# Patient Record
Sex: FEMALE | Race: ASIAN | Employment: UNEMPLOYED | URBAN - METROPOLITAN AREA
[De-identification: names, ages, dates, MRNs, and addresses within clinical notes are randomized per-mention and may not be internally consistent; named-entity substitution may affect disease eponyms.]

---

## 2022-11-23 ENCOUNTER — HOSPITAL ENCOUNTER (EMERGENCY)
Age: 37
Discharge: HOME | End: 2022-11-23
Attending: EMERGENCY MEDICINE
Payer: COMMERCIAL

## 2022-11-23 VITALS
RESPIRATION RATE: 16 BRPM | SYSTOLIC BLOOD PRESSURE: 101 MMHG | HEART RATE: 84 BPM | OXYGEN SATURATION: 99 % | DIASTOLIC BLOOD PRESSURE: 61 MMHG | TEMPERATURE: 98.3 F

## 2022-11-23 DIAGNOSIS — Z20.822 CONTACT WITH AND (SUSPECTED) EXPOSURE TO COVID-19: Primary | ICD-10-CM

## 2022-11-23 LAB
COVID-19 RAPID TEST, COVR: NOT DETECTED
FLUAV AG NPH QL IA: NEGATIVE
FLUBV AG NOSE QL IA: NEGATIVE
SOURCE, COVRS: NORMAL

## 2022-11-23 PROCEDURE — 87635 SARS-COV-2 COVID-19 AMP PRB: CPT

## 2022-11-23 PROCEDURE — 87804 INFLUENZA ASSAY W/OPTIC: CPT

## 2022-11-23 PROCEDURE — U0005 INFEC AGEN DETEC AMPLI PROBE: HCPCS

## 2022-11-23 PROCEDURE — 99283 EMERGENCY DEPT VISIT LOW MDM: CPT

## 2022-11-24 NOTE — ED TRIAGE NOTES
Triage: Pt arrives ambulatory from home with CC of covid exposure. Pt denies symptoms but is concerned because she is 8 weeks pregnant.

## 2022-11-24 NOTE — DISCHARGE INSTRUCTIONS
You tested negative for flu and covid. You likely have a viral illness. Take tylenol as needed for fever/chills. Recommend follow-up with PCP as well as establishing care with an OBGYN in the area.
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
## 2022-12-08 ENCOUNTER — HOSPITAL ENCOUNTER (EMERGENCY)
Age: 37
Discharge: HOME | End: 2022-12-08
Attending: STUDENT IN AN ORGANIZED HEALTH CARE EDUCATION/TRAINING PROGRAM
Payer: COMMERCIAL

## 2022-12-08 VITALS
RESPIRATION RATE: 16 BRPM | TEMPERATURE: 98.3 F | OXYGEN SATURATION: 98 % | DIASTOLIC BLOOD PRESSURE: 67 MMHG | SYSTOLIC BLOOD PRESSURE: 97 MMHG | HEART RATE: 102 BPM
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
## 2022-12-08 DIAGNOSIS — S13.4XXA WHIPLASH INJURY TO NECK, INITIAL ENCOUNTER: ICD-10-CM

## 2022-12-08 DIAGNOSIS — Z34.92 SECOND TRIMESTER PREGNANCY: ICD-10-CM

## 2022-12-08 DIAGNOSIS — S39.012A STRAIN OF LUMBAR REGION, INITIAL ENCOUNTER: ICD-10-CM

## 2022-12-08 PROCEDURE — 99283 EMERGENCY DEPT VISIT LOW MDM: CPT

## 2022-12-08 RX ORDER — LIDOCAINE 50 MG/G
PATCH TOPICAL
Qty: 5 EACH | Refills: 0 | Status: SHIPPED | OUTPATIENT
Start: 2022-12-08 | End: 2022-12-08 | Stop reason: SDUPTHER

## 2022-12-08 RX ORDER — LIDOCAINE 50 MG/G
PATCH TOPICAL
Qty: 5 EACH | Refills: 0 | Status: SHIPPED | OUTPATIENT
Start: 2022-12-08

## 2022-12-08 RX ORDER — CYCLOBENZAPRINE HCL 10 MG
10 TABLET ORAL
Qty: 21 TABLET | Refills: 0 | Status: SHIPPED | OUTPATIENT
Start: 2022-12-08 | End: 2022-12-15

## 2022-12-08 RX ORDER — CYCLOBENZAPRINE HCL 10 MG
10 TABLET ORAL
Qty: 21 TABLET | Refills: 0 | Status: SHIPPED | OUTPATIENT
Start: 2022-12-08 | End: 2022-12-08 | Stop reason: SDUPTHER

## 2022-12-08 NOTE — DISCHARGE INSTRUCTIONS
[facility] SYSTEMS Departments     For adult and child immunizations, family planning, TB screening, STD testing and women's health services. [facility]: [place] [phone]      [facility] 25   [address]   [address]   [address] [facility] [address]: [address] [phone]      [address]          [address] [place] [address] [address]     For primary care services, woman and child wellness, and some clinics providing specialty care. [facility] -- [facility] [facility]. 2525 [facility] [phone]   411 [facility] 200 [facility] 3614 [facility] [phone]   [address] [facility] [address] [facility] [address] [phone] [address]  [employer] [address] [place] [address] 5850  Community  [phone]   [address] [phone]   [facility] 81 [facility] [phone]   [facility] [address] [place] [address] [phone]   [facility]: [facility] [address], [address] [facility] [phone]     [place] [address] [facility] [address] [phone]   [facility] 58[address] [facility]  [phone]   [employer]  [address], [address] (www.[employer] - innovate | mentor | invest/about/mission. asp) 242-709-AEZS         Sexual Health/Woman Wellness Clinics    For STD/HIV testing and treatment, pregnancy testing and services, men's health, birth control services, LGBT services, and hepatitis/HPV vaccine services. [employer] for [place] [employer] [address] [facility] 75 [facility] 1579 [address] [phone]   [facility] [address], 5th floor [phone]   Pregnancy [address] for Women [address]  Angelia Randall [phone]         Specialty Service [address]    [phone]   [place]   [phone]   Women, Infant and Children's Services: [address] [phone] 600 [facility]   [phone]   [address]   [facility]     [phone]   [address] Crisis   [address] [facility] [address] [phone]       Local Primary Care Physicians  [facility] [phone]  Sandria Ledger, MD Irineo Myron, MD Corrinne Decant, MD [facility] [phone]  Taisha Bright, [facility]  MD Dante Livingston MD Evelena Morgans, MD [address] [address] [phone]  MD Darlin Bonilla MD [address] [facility] [phone]  MD Leigh Ann Wolff MD Hetty [facility], MD Meme Delgado MD   [facility] [phone]  [facility] MD Adolph KUMAR MD Jeoffrey [facility], NP [address] [phone]  MD Gaviota Lara MD Merdis Birk, MD Sherill Brocks, MD Arcadio Oxford, MD Deirdre Spanish, MD Kirt Blacksmith, MD   33 57 [facility]  Rosmery Pagan MD [address] [phone]  Dennise Kemp, MD Lauren Sellers, NP  [place] , MD Nayana Haywood, MD Lazarus Palmer, MD Mendel Spillers, MD Delford Rayas, MD   [address] [facility] [phone]  MD Jordan Carter, P  [facility], MD Amy Garcia MD Philemon Gasmen, MD Janece Rouge, MD [facility] [phone]  MD Yoon Graham MD Silvio Shears, MD Vergie Bureau, MD Shelbie Kinnier, MD   [address] [phone]  MD Misa Lozano MD [place] [phone]  MD Rosy Alexander, MD Willy Kaiser Aliyah Olivier, [address] [facility] [phone]  Nirav Client, MD Reinier Hall, MD Clint Braxton, MD Belle Perez, MD Daniel Jeronimo, MD Tony Lin, NP  Ran Miller MD 1619 [facility]   [phone]  Gian Fox, MD Clarisse Polanco, MD Mony Rankin MD   2102 [facility] [phone]  Krissy Savage, MD Teri Martinez, P  Rosalino London, PACHRIS London, AJ Sims, MD Scott Ortega, NP   Adina Dela Cruz, DO Miscellaneous:  Adrian Vigil [phone]
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
